# Patient Record
Sex: FEMALE | Race: WHITE | URBAN - METROPOLITAN AREA
[De-identification: names, ages, dates, MRNs, and addresses within clinical notes are randomized per-mention and may not be internally consistent; named-entity substitution may affect disease eponyms.]

---

## 2017-01-31 ENCOUNTER — TRANSFERRED RECORDS (OUTPATIENT)
Dept: HEALTH INFORMATION MANAGEMENT | Facility: CLINIC | Age: 47
End: 2017-01-31

## 2017-03-10 ENCOUNTER — TRANSFERRED RECORDS (OUTPATIENT)
Dept: HEALTH INFORMATION MANAGEMENT | Facility: CLINIC | Age: 47
End: 2017-03-10

## 2017-03-28 ENCOUNTER — TRANSFERRED RECORDS (OUTPATIENT)
Dept: HEALTH INFORMATION MANAGEMENT | Facility: CLINIC | Age: 47
End: 2017-03-28

## 2017-06-15 ENCOUNTER — TRANSFERRED RECORDS (OUTPATIENT)
Dept: HEALTH INFORMATION MANAGEMENT | Facility: CLINIC | Age: 47
End: 2017-06-15

## 2017-07-02 ENCOUNTER — TRANSFERRED RECORDS (OUTPATIENT)
Dept: HEALTH INFORMATION MANAGEMENT | Facility: CLINIC | Age: 47
End: 2017-07-02

## 2017-07-11 ENCOUNTER — HOSPITAL ENCOUNTER (OUTPATIENT)
Facility: CLINIC | Age: 47
Setting detail: OBSERVATION
Discharge: HOME | End: 2017-07-12
Attending: EMERGENCY MEDICINE | Admitting: HOSPITALIST

## 2017-07-11 DIAGNOSIS — R10.9 CHRONIC ABDOMINAL PAIN: ICD-10-CM

## 2017-07-11 DIAGNOSIS — G89.29 CHRONIC ABDOMINAL PAIN: ICD-10-CM

## 2017-07-11 DIAGNOSIS — R11.2 NAUSEA AND VOMITING, INTRACTABILITY OF VOMITING NOT SPECIFIED, UNSPECIFIED VOMITING TYPE: ICD-10-CM

## 2017-07-11 LAB
ALBUMIN SERPL-MCNC: 4.2 G/DL (ref 3.4–5)
ALBUMIN UR-MCNC: NEGATIVE MG/DL
ALP SERPL-CCNC: 86 U/L (ref 40–150)
ALT SERPL W P-5'-P-CCNC: 45 U/L (ref 0–50)
AMPHETAMINES UR QL SCN: ABNORMAL
ANION GAP SERPL CALCULATED.3IONS-SCNC: 8 MMOL/L (ref 3–14)
APPEARANCE UR: CLEAR
AST SERPL W P-5'-P-CCNC: 21 U/L (ref 0–45)
BARBITURATES UR QL: ABNORMAL
BASOPHILS # BLD AUTO: 0.1 10E9/L (ref 0–0.2)
BASOPHILS NFR BLD AUTO: 0.4 %
BENZODIAZ UR QL: ABNORMAL
BILIRUB SERPL-MCNC: 1.8 MG/DL (ref 0.2–1.3)
BILIRUB UR QL STRIP: NEGATIVE
BUN SERPL-MCNC: 12 MG/DL (ref 7–30)
CALCIUM SERPL-MCNC: 8.9 MG/DL (ref 8.5–10.1)
CANNABINOIDS UR QL SCN: ABNORMAL
CHLORIDE SERPL-SCNC: 106 MMOL/L (ref 94–109)
CO2 SERPL-SCNC: 27 MMOL/L (ref 20–32)
COCAINE UR QL: ABNORMAL
COLOR UR AUTO: YELLOW
CREAT SERPL-MCNC: 0.48 MG/DL (ref 0.52–1.04)
DIFFERENTIAL METHOD BLD: ABNORMAL
EOSINOPHIL # BLD AUTO: 0 10E9/L (ref 0–0.7)
EOSINOPHIL NFR BLD AUTO: 0.1 %
ERYTHROCYTE [DISTWIDTH] IN BLOOD BY AUTOMATED COUNT: 18.4 % (ref 10–15)
GFR SERPL CREATININE-BSD FRML MDRD: ABNORMAL ML/MIN/1.7M2
GLUCOSE BLDC GLUCOMTR-MCNC: 223 MG/DL (ref 70–99)
GLUCOSE BLDC GLUCOMTR-MCNC: 244 MG/DL (ref 70–99)
GLUCOSE BLDC GLUCOMTR-MCNC: 286 MG/DL (ref 70–99)
GLUCOSE SERPL-MCNC: 222 MG/DL (ref 70–99)
GLUCOSE UR STRIP-MCNC: 150 MG/DL
HCT VFR BLD AUTO: 35.6 % (ref 35–47)
HGB BLD-MCNC: 11 G/DL (ref 11.7–15.7)
HGB UR QL STRIP: NEGATIVE
IMM GRANULOCYTES # BLD: 0.1 10E9/L (ref 0–0.4)
IMM GRANULOCYTES NFR BLD: 0.5 %
KETONES UR STRIP-MCNC: 80 MG/DL
LACTATE BLD-SCNC: 1 MMOL/L (ref 0.7–2.1)
LACTATE SERPL-SCNC: 2.6 MMOL/L (ref 0.4–2)
LEUKOCYTE ESTERASE UR QL STRIP: NEGATIVE
LIPASE SERPL-CCNC: 113 U/L (ref 73–393)
LYMPHOCYTES # BLD AUTO: 2.5 10E9/L (ref 0.8–5.3)
LYMPHOCYTES NFR BLD AUTO: 18.3 %
MCH RBC QN AUTO: 21.2 PG (ref 26.5–33)
MCHC RBC AUTO-ENTMCNC: 30.9 G/DL (ref 31.5–36.5)
MCV RBC AUTO: 69 FL (ref 78–100)
MONOCYTES # BLD AUTO: 0.6 10E9/L (ref 0–1.3)
MONOCYTES NFR BLD AUTO: 4.6 %
MUCOUS THREADS #/AREA URNS LPF: PRESENT /LPF
NEUTROPHILS # BLD AUTO: 10.4 10E9/L (ref 1.6–8.3)
NEUTROPHILS NFR BLD AUTO: 76.1 %
NITRATE UR QL: NEGATIVE
NRBC # BLD AUTO: 0 10*3/UL
NRBC BLD AUTO-RTO: 0 /100
OPIATES UR QL SCN: ABNORMAL
PCP UR QL SCN: ABNORMAL
PH UR STRIP: 6 PH (ref 5–7)
PLATELET # BLD AUTO: 360 10E9/L (ref 150–450)
POTASSIUM SERPL-SCNC: 3.4 MMOL/L (ref 3.4–5.3)
PROT SERPL-MCNC: 7.7 G/DL (ref 6.8–8.8)
RBC # BLD AUTO: 5.2 10E12/L (ref 3.8–5.2)
RBC #/AREA URNS AUTO: 1 /HPF (ref 0–2)
SODIUM SERPL-SCNC: 141 MMOL/L (ref 133–144)
SP GR UR STRIP: 1.02 (ref 1–1.03)
URN SPEC COLLECT METH UR: ABNORMAL
UROBILINOGEN UR STRIP-MCNC: 2 MG/DL (ref 0–2)
WBC # BLD AUTO: 13.7 10E9/L (ref 4–11)
WBC #/AREA URNS AUTO: 1 /HPF (ref 0–2)

## 2017-07-11 PROCEDURE — 25000128 H RX IP 250 OP 636: Performed by: PHYSICIAN ASSISTANT

## 2017-07-11 PROCEDURE — 96376 TX/PRO/DX INJ SAME DRUG ADON: CPT

## 2017-07-11 PROCEDURE — 25000125 ZZHC RX 250: Performed by: PHYSICIAN ASSISTANT

## 2017-07-11 PROCEDURE — 83690 ASSAY OF LIPASE: CPT | Performed by: EMERGENCY MEDICINE

## 2017-07-11 PROCEDURE — 83605 ASSAY OF LACTIC ACID: CPT | Performed by: PHYSICIAN ASSISTANT

## 2017-07-11 PROCEDURE — 96375 TX/PRO/DX INJ NEW DRUG ADDON: CPT

## 2017-07-11 PROCEDURE — 25000125 ZZHC RX 250: Performed by: EMERGENCY MEDICINE

## 2017-07-11 PROCEDURE — 25000132 ZZH RX MED GY IP 250 OP 250 PS 637: Performed by: PHYSICIAN ASSISTANT

## 2017-07-11 PROCEDURE — 80307 DRUG TEST PRSMV CHEM ANLYZR: CPT | Performed by: EMERGENCY MEDICINE

## 2017-07-11 PROCEDURE — 96372 THER/PROPH/DIAG INJ SC/IM: CPT

## 2017-07-11 PROCEDURE — 96361 HYDRATE IV INFUSION ADD-ON: CPT

## 2017-07-11 PROCEDURE — 25000128 H RX IP 250 OP 636: Performed by: EMERGENCY MEDICINE

## 2017-07-11 PROCEDURE — 83605 ASSAY OF LACTIC ACID: CPT | Performed by: EMERGENCY MEDICINE

## 2017-07-11 PROCEDURE — 00000146 ZZHCL STATISTIC GLUCOSE BY METER IP

## 2017-07-11 PROCEDURE — 81001 URINALYSIS AUTO W/SCOPE: CPT | Performed by: EMERGENCY MEDICINE

## 2017-07-11 PROCEDURE — 36415 COLL VENOUS BLD VENIPUNCTURE: CPT | Performed by: PHYSICIAN ASSISTANT

## 2017-07-11 PROCEDURE — 99285 EMERGENCY DEPT VISIT HI MDM: CPT | Mod: 25

## 2017-07-11 PROCEDURE — 99220 ZZC INITIAL OBSERVATION CARE,LEVL III: CPT | Performed by: PHYSICIAN ASSISTANT

## 2017-07-11 PROCEDURE — 25000131 ZZH RX MED GY IP 250 OP 636 PS 637: Performed by: PHYSICIAN ASSISTANT

## 2017-07-11 PROCEDURE — 96374 THER/PROPH/DIAG INJ IV PUSH: CPT

## 2017-07-11 PROCEDURE — 85025 COMPLETE CBC W/AUTO DIFF WBC: CPT | Performed by: EMERGENCY MEDICINE

## 2017-07-11 PROCEDURE — 80053 COMPREHEN METABOLIC PANEL: CPT | Performed by: EMERGENCY MEDICINE

## 2017-07-11 PROCEDURE — G0378 HOSPITAL OBSERVATION PER HR: HCPCS

## 2017-07-11 RX ORDER — PROCHLORPERAZINE 25 MG
25 SUPPOSITORY, RECTAL RECTAL EVERY 12 HOURS PRN
Status: DISCONTINUED | OUTPATIENT
Start: 2017-07-11 | End: 2017-07-12 | Stop reason: HOSPADM

## 2017-07-11 RX ORDER — ONDANSETRON 2 MG/ML
4 INJECTION INTRAMUSCULAR; INTRAVENOUS EVERY 6 HOURS PRN
Status: DISCONTINUED | OUTPATIENT
Start: 2017-07-11 | End: 2017-07-12 | Stop reason: HOSPADM

## 2017-07-11 RX ORDER — IBUPROFEN 600 MG/1
600 TABLET, FILM COATED ORAL EVERY 6 HOURS PRN
Status: DISCONTINUED | OUTPATIENT
Start: 2017-07-11 | End: 2017-07-12 | Stop reason: HOSPADM

## 2017-07-11 RX ORDER — DIPHENHYDRAMINE HYDROCHLORIDE 50 MG/ML
25 INJECTION INTRAMUSCULAR; INTRAVENOUS ONCE
Status: COMPLETED | OUTPATIENT
Start: 2017-07-11 | End: 2017-07-11

## 2017-07-11 RX ORDER — ONDANSETRON 4 MG/1
4 TABLET, ORALLY DISINTEGRATING ORAL EVERY 6 HOURS PRN
Status: DISCONTINUED | OUTPATIENT
Start: 2017-07-11 | End: 2017-07-12 | Stop reason: HOSPADM

## 2017-07-11 RX ORDER — ONDANSETRON 4 MG/1
4 TABLET, ORALLY DISINTEGRATING ORAL EVERY 8 HOURS PRN
Qty: 5 TABLET | Refills: 0 | Status: SHIPPED | OUTPATIENT
Start: 2017-07-11 | End: 2017-07-12

## 2017-07-11 RX ORDER — DULOXETIN HYDROCHLORIDE 30 MG/1
60 CAPSULE, DELAYED RELEASE ORAL EVERY EVENING
Status: DISCONTINUED | OUTPATIENT
Start: 2017-07-11 | End: 2017-07-12 | Stop reason: HOSPADM

## 2017-07-11 RX ORDER — METOCLOPRAMIDE HYDROCHLORIDE 5 MG/ML
10 INJECTION INTRAMUSCULAR; INTRAVENOUS ONCE
Status: COMPLETED | OUTPATIENT
Start: 2017-07-11 | End: 2017-07-11

## 2017-07-11 RX ORDER — POLYETHYLENE GLYCOL 3350 17 G/17G
17 POWDER, FOR SOLUTION ORAL DAILY PRN
Status: DISCONTINUED | OUTPATIENT
Start: 2017-07-11 | End: 2017-07-12 | Stop reason: HOSPADM

## 2017-07-11 RX ORDER — DEXTROSE MONOHYDRATE 25 G/50ML
25-50 INJECTION, SOLUTION INTRAVENOUS
Status: DISCONTINUED | OUTPATIENT
Start: 2017-07-11 | End: 2017-07-12 | Stop reason: HOSPADM

## 2017-07-11 RX ORDER — AMOXICILLIN 250 MG
1-2 CAPSULE ORAL 2 TIMES DAILY PRN
Status: DISCONTINUED | OUTPATIENT
Start: 2017-07-11 | End: 2017-07-12 | Stop reason: HOSPADM

## 2017-07-11 RX ORDER — PROMETHAZINE HYDROCHLORIDE 25 MG/ML
25 INJECTION, SOLUTION INTRAMUSCULAR; INTRAVENOUS ONCE
Status: COMPLETED | OUTPATIENT
Start: 2017-07-11 | End: 2017-07-11

## 2017-07-11 RX ORDER — MORPHINE SULFATE 4 MG/ML
4 INJECTION, SOLUTION INTRAMUSCULAR; INTRAVENOUS ONCE
Status: DISCONTINUED | OUTPATIENT
Start: 2017-07-11 | End: 2017-07-11

## 2017-07-11 RX ORDER — METOCLOPRAMIDE HYDROCHLORIDE 5 MG/ML
10 INJECTION INTRAMUSCULAR; INTRAVENOUS EVERY 6 HOURS PRN
Status: DISCONTINUED | OUTPATIENT
Start: 2017-07-11 | End: 2017-07-12 | Stop reason: HOSPADM

## 2017-07-11 RX ORDER — MORPHINE SULFATE 2 MG/ML
2 INJECTION, SOLUTION INTRAMUSCULAR; INTRAVENOUS ONCE
Status: COMPLETED | OUTPATIENT
Start: 2017-07-11 | End: 2017-07-11

## 2017-07-11 RX ORDER — KETOROLAC TROMETHAMINE 30 MG/ML
30 INJECTION, SOLUTION INTRAMUSCULAR; INTRAVENOUS EVERY 6 HOURS PRN
Status: DISCONTINUED | OUTPATIENT
Start: 2017-07-11 | End: 2017-07-12 | Stop reason: HOSPADM

## 2017-07-11 RX ORDER — LORAZEPAM 2 MG/ML
.5-1 INJECTION INTRAMUSCULAR EVERY 4 HOURS PRN
Status: DISCONTINUED | OUTPATIENT
Start: 2017-07-11 | End: 2017-07-12 | Stop reason: HOSPADM

## 2017-07-11 RX ORDER — ACETAMINOPHEN 325 MG/1
650 TABLET ORAL EVERY 4 HOURS PRN
Status: DISCONTINUED | OUTPATIENT
Start: 2017-07-11 | End: 2017-07-12 | Stop reason: HOSPADM

## 2017-07-11 RX ORDER — OXYCODONE AND ACETAMINOPHEN 5; 325 MG/1; MG/1
TABLET ORAL EVERY 4 HOURS PRN
COMMUNITY

## 2017-07-11 RX ORDER — METOCLOPRAMIDE HYDROCHLORIDE 5 MG/ML
5 INJECTION INTRAMUSCULAR; INTRAVENOUS ONCE
Status: COMPLETED | OUTPATIENT
Start: 2017-07-11 | End: 2017-07-11

## 2017-07-11 RX ORDER — METOCLOPRAMIDE 10 MG/1
10 TABLET ORAL EVERY 6 HOURS PRN
Status: DISCONTINUED | OUTPATIENT
Start: 2017-07-11 | End: 2017-07-12 | Stop reason: HOSPADM

## 2017-07-11 RX ORDER — MORPHINE SULFATE 4 MG/ML
4 INJECTION, SOLUTION INTRAMUSCULAR; INTRAVENOUS ONCE
Status: COMPLETED | OUTPATIENT
Start: 2017-07-11 | End: 2017-07-11

## 2017-07-11 RX ORDER — NICOTINE POLACRILEX 4 MG
15-30 LOZENGE BUCCAL
Status: DISCONTINUED | OUTPATIENT
Start: 2017-07-11 | End: 2017-07-12 | Stop reason: HOSPADM

## 2017-07-11 RX ORDER — ONDANSETRON 2 MG/ML
4 INJECTION INTRAMUSCULAR; INTRAVENOUS ONCE
Status: COMPLETED | OUTPATIENT
Start: 2017-07-11 | End: 2017-07-11

## 2017-07-11 RX ORDER — PANTOPRAZOLE SODIUM 40 MG/1
40 TABLET, DELAYED RELEASE ORAL
Status: DISCONTINUED | OUTPATIENT
Start: 2017-07-11 | End: 2017-07-12 | Stop reason: HOSPADM

## 2017-07-11 RX ORDER — NALOXONE HYDROCHLORIDE 0.4 MG/ML
.1-.4 INJECTION, SOLUTION INTRAMUSCULAR; INTRAVENOUS; SUBCUTANEOUS
Status: DISCONTINUED | OUTPATIENT
Start: 2017-07-11 | End: 2017-07-12 | Stop reason: HOSPADM

## 2017-07-11 RX ORDER — PROCHLORPERAZINE MALEATE 5 MG
5-10 TABLET ORAL EVERY 6 HOURS PRN
Status: DISCONTINUED | OUTPATIENT
Start: 2017-07-11 | End: 2017-07-12 | Stop reason: HOSPADM

## 2017-07-11 RX ORDER — SODIUM CHLORIDE 9 MG/ML
INJECTION, SOLUTION INTRAVENOUS CONTINUOUS
Status: ACTIVE | OUTPATIENT
Start: 2017-07-11 | End: 2017-07-12

## 2017-07-11 RX ORDER — ONDANSETRON 4 MG/1
4 TABLET, ORALLY DISINTEGRATING ORAL ONCE
Status: COMPLETED | OUTPATIENT
Start: 2017-07-11 | End: 2017-07-11

## 2017-07-11 RX ORDER — PANTOPRAZOLE SODIUM 40 MG/1
40 TABLET, DELAYED RELEASE ORAL
Status: DISCONTINUED | OUTPATIENT
Start: 2017-07-11 | End: 2017-07-11

## 2017-07-11 RX ADMIN — SODIUM CHLORIDE: 9 INJECTION, SOLUTION INTRAVENOUS at 10:17

## 2017-07-11 RX ADMIN — ONDANSETRON 4 MG: 4 TABLET, ORALLY DISINTEGRATING ORAL at 07:27

## 2017-07-11 RX ADMIN — MORPHINE SULFATE 2 MG: 2 INJECTION, SOLUTION INTRAMUSCULAR; INTRAVENOUS at 08:39

## 2017-07-11 RX ADMIN — INSULIN DETEMIR 25 UNITS: 100 INJECTION, SOLUTION SUBCUTANEOUS at 21:49

## 2017-07-11 RX ADMIN — ONDANSETRON 4 MG: 2 INJECTION INTRAMUSCULAR; INTRAVENOUS at 11:26

## 2017-07-11 RX ADMIN — SODIUM CHLORIDE: 9 INJECTION, SOLUTION INTRAVENOUS at 18:18

## 2017-07-11 RX ADMIN — LORAZEPAM 1 MG: 2 INJECTION INTRAMUSCULAR; INTRAVENOUS at 16:44

## 2017-07-11 RX ADMIN — DULOXETINE HYDROCHLORIDE 60 MG: 30 CAPSULE, DELAYED RELEASE ORAL at 21:48

## 2017-07-11 RX ADMIN — PROMETHAZINE HYDROCHLORIDE 25 MG: 25 INJECTION INTRAMUSCULAR; INTRAVENOUS at 05:57

## 2017-07-11 RX ADMIN — PANTOPRAZOLE SODIUM 40 MG: 40 INJECTION, POWDER, FOR SOLUTION INTRAVENOUS at 11:26

## 2017-07-11 RX ADMIN — DIPHENHYDRAMINE HYDROCHLORIDE 25 MG: 50 INJECTION, SOLUTION INTRAMUSCULAR; INTRAVENOUS at 08:38

## 2017-07-11 RX ADMIN — KETOROLAC TROMETHAMINE 30 MG: 30 INJECTION, SOLUTION INTRAMUSCULAR; INTRAVENOUS at 10:18

## 2017-07-11 RX ADMIN — SODIUM CHLORIDE 1000 ML: 9 INJECTION, SOLUTION INTRAVENOUS at 08:55

## 2017-07-11 RX ADMIN — PROCHLORPERAZINE EDISYLATE 10 MG: 5 INJECTION INTRAMUSCULAR; INTRAVENOUS at 10:17

## 2017-07-11 RX ADMIN — METOCLOPRAMIDE 10 MG: 5 INJECTION, SOLUTION INTRAMUSCULAR; INTRAVENOUS at 04:37

## 2017-07-11 RX ADMIN — MORPHINE SULFATE 4 MG: 4 INJECTION, SOLUTION INTRAMUSCULAR; INTRAVENOUS at 04:58

## 2017-07-11 RX ADMIN — SODIUM CHLORIDE 1000 ML: 9 INJECTION, SOLUTION INTRAVENOUS at 04:36

## 2017-07-11 RX ADMIN — METOCLOPRAMIDE 5 MG: 5 INJECTION, SOLUTION INTRAMUSCULAR; INTRAVENOUS at 08:38

## 2017-07-11 RX ADMIN — METOCLOPRAMIDE 10 MG: 5 INJECTION, SOLUTION INTRAMUSCULAR; INTRAVENOUS at 15:01

## 2017-07-11 RX ADMIN — ONDANSETRON 4 MG: 2 INJECTION INTRAMUSCULAR; INTRAVENOUS at 04:36

## 2017-07-11 ASSESSMENT — ENCOUNTER SYMPTOMS
BLOOD IN STOOL: 0
APPETITE CHANGE: 0
NAUSEA: 1
ABDOMINAL PAIN: 1
VOMITING: 1
CHILLS: 0
DIARRHEA: 0
DYSURIA: 0
FEVER: 0

## 2017-07-11 NOTE — ED NOTES
Patient is hear visiting from [place]. States she has been dealing with N/V and abdominal pain for the last 2 years and had multiple workups with no definitive cause for symptoms. Pt reports burning pain in her abdomen.

## 2017-07-11 NOTE — ED PROVIDER NOTES
At time of patient started having episodes of nausea and vomiting again. She also reports upper abdominal pain. The plan initially per Dr. Garcia was to have patient discharged home with medication for nausea. However based on her re-recurrent symptoms and difficulty controlling recurrent vomiting despite multiple IV medications, I felt patient would require hospitalization for further treatment. On her repeat examination she had a soft abdomen, hypoactive bowel sounds, but no evidence of obstructive findings or other areas concerning pain specifically in the lower abdomen. Therefore I did not feel CT scan of the abdomen was warranted at this time. I discussed case Hospitalist who agreed to admit the patient.     Wang Paul MD  07/11/17 0810

## 2017-07-11 NOTE — IP AVS SNAPSHOT
MRN:[id number]                      After Visit Summary   7/11/2017    Elizabet Hooker    MRN: [id number]           Thank you!     Thank you for choosing [facility] for your care. Our goal is always to provide you with excellent care. Hearing back from our patients is one way we can continue to improve our services. Please take a few minutes to complete the written survey that you may receive in the mail after you visit. If you would like to speak to someone directly about your visit please contact Patient Relations at [phone]. Thank you!          Patient Information     Date Of Birth          1970        About your hospital stay     You were admitted on:  July 11, 2017 You last received care in the:  [facility] Observation Department    You were discharged on:  July 12, 2017        Reason for your hospital stay       Nausea, vomiting, abdominal pain. Likely cannabinoid hyperemesis syndrome                  Who to Call     For medical emergencies, please call 911.  For non-urgent questions about your medical care, please call your primary care provider or clinic, None          Attending Provider     Provider Specialty    Wang Mg MD Emergency Medicine    Wang Paul MD Emergency Medicine    Ant Hoffman MD Internal Medicine       Primary Care Provider    [facility]      After Care Instructions     Activity       Your activity upon discharge: activity as tolerated            Diet       Follow this diet upon discharge: Orders Placed This Encounter      Advance Diet as Tolerated: Regular Diet Adult                  Follow-up Appointments     Follow-up and recommended labs and tests        Follow up with primary care provider, [facility] Other, within 7 days for hospital follow- up.  No follow up labs or test are needed.                   Follow-up Information     Follow up with primary care physician in [facility]. Schedule an  "appointment as soon as possible for a visit in 2 days.        Follow up with [facility] Emergency Department.    Specialty:  EMERGENCY MEDICINE    Why:  As needed, If symptoms worsen    Contact information:    [address]  [facility] [address] [phone]                Pending Results     No orders found for last 3 day(s).            Statement of Approval     Ordered          17 0837  I have reviewed and agree with all the recommendations and orders detailed in this document.  EFFECTIVE NOW     Approved and electronically signed by:  Ant Hoffman MD             Admission Information     Date & Time Provider Department Dept. Phone    2017 Ant Hoffman MD [facility] Observation Department [phone]      Your Vitals Were     Blood Pressure Pulse Temperature Respirations Height Weight    126/69 (BP Location: Right arm) 96 99.1  F (37.3  C) (Oral) 16 1.6 m (5' 3\") 82.6 kg (182 lb)    Pulse Oximetry BMI (Body Mass Index)                94% 32.24 kg/m2          [employer] Information     [facility] lets you send messages to your doctor, view your test results, renew your prescriptions, schedule appointments and more. To sign up, go to [email or web address] . Click on \"Log in\" on the left side of the screen, which will take you to the Welcome page. Then click on \"Sign up Now\" on the right side of the page.     You will be asked to enter the access code listed below, as well as some personal information. Please follow the directions to create your username and password.     Your access code is: [email or web address]  Expires: 10/9/2017  6:48 AM     Your access code will  in 90 days. If you need help or a new code, please call your [place] clinic or [phone].        Care EveryWhere ID     This is your Care EveryWhere ID. This could be used by other organizations to access your [facility] medical records  [id number]        Equal Access to Services     " MARIAN MCMANUS : Hadii aad ku la nena Soobduliaali, waaxda luqadaha, qaybta kaalmada adeeganette, waxkwasi enriqueta haygautam carrascojoecassie alcala . So [facility] [phone].    ATENCIÓN: Si habla español, tiene a brunson disposición servicios gratuitos de asistencia lingüística. Llame al [phone].    We comply with applicable federal civil rights laws and [place] laws. We do not discriminate on the basis of race, color, national origin, age, disability sex, sexual orientation or gender identity.               Review of your medicines      CONTINUE these medicines which have NOT CHANGED        Dose / Directions    CYMBALTA PO        Dose:  60 mg   Take 60 mg by mouth every evening   Refills:  0       HumaLOG KWIKpen 100 UNIT/ML injection   Generic drug:  insulin lispro        Dose:  10-15 Units   Inject 10-15 Units Subcutaneous 3 times daily (before meals)   Refills:  0       LEVEMIR FLEXPEN/FLEXTOUCH 100 UNIT/ML injection   Generic drug:  insulin detemir        Dose:  50 Units   Inject 50 Units Subcutaneous At Bedtime   Refills:  0       oxyCODONE-acetaminophen 5-325 MG per tablet   Commonly known as:  PERCOCET        Take by mouth every 4 hours as needed for moderate to severe pain   Refills:  0       PANTOPRAZOLE SODIUM PO        Dose:  40 mg   Take 40 mg by mouth 2 times daily (before meals)   Refills:  0                Protect others around you: Learn how to safely use, store and throw away your medicines at www.disposemymeds.org.             Medication List: This is a list of all your medications and when to take them. Check marks below indicate your daily home schedule. Keep this list as a reference.      Medications           Morning Afternoon Evening Bedtime As Needed    CYMBALTA PO   Take 60 mg by mouth every evening   Last time this was given:  60 mg on 7/11/2017  9:48 PM   Next Dose Due:  Take next dose this evening                                   HumaLOG KWIKpen 100 UNIT/ML injection   Inject 10-15 Units Subcutaneous 3  times daily (before meals)   Generic drug:  insulin lispro   Next Dose Due:  Resume home regimen                                LEVEMIR FLEXPEN/FLEXTOUCH 100 UNIT/ML injection   Inject 50 Units Subcutaneous At Bedtime   Last time this was given:  25 Units on 7/11/2017  9:49 PM   Generic drug:  insulin detemir   Next Dose Due:  Take next dose tonight before bed                                   oxyCODONE-acetaminophen 5-325 MG per tablet   Commonly known as:  PERCOCET   Take by mouth every 4 hours as needed for moderate to severe pain                                   PANTOPRAZOLE SODIUM PO   Take 40 mg by mouth 2 times daily (before meals)   Next Dose Due:  Take next dose before dinner                                             More Information        *Abdominal Pain, Unknown Cause (Female)    The exact cause of your abdominal (stomach) pain is not certain. This does not mean that this is something to worry about, or the right tests were not done. Everyone likes to know the exact cause of the problem, but sometimes with abdominal pain, there is no clear-cut cause, and this could be a good thing. The good news is that your symptoms can be treated, and you will feel better.   Your condition does not seem serious now; however, sometimes the signs of a serious problem may take more time to appear. For this reason, it is important for you to watch for any new symptoms, problems, or worsening of your condition.  Over the next few days, the abdominal pain may come and go, or be continuous. Other common symptoms can include nausea and vomiting. Sometimes it can be difficult to tell if you feel nauseous, you may just feel bad and not associate that feeling with nausea. Constipation, diarrhea, and a fever may go along with the pain.  The pain may continue even if treated correctly over the following days. Depending on how things go, sometimes the cause can become clear and may require further or different treatment.  Additional evaluations, medications, or tests may be needed.  Home care  Your health care provider may prescribe medications for pain, symptoms, or an infection.  Follow the health care provider's instructions for taking these medications.  General care    Rest until your next exam. No strenuous activities.    Try to find positions that ease discomfort. A small pillow placed on the abdomen may help relieve pain.    Something warm on your abdomen (such as a heating pad) may help, but be careful not to burn yourself.  Diet    Do not force yourself to eat, especially if having cramps, vomiting, or diarrhea.    Water is important so you do not get dehydrated. Soup may also be good. Sports drinks may also help, especially if they are not too acidic. Make sure you don't drink sugary drinks as this can make things worse. Take liquids in small amounts. Do not guzzle them.    Caffeine sometimes makes the pain and cramping worse.    Avoid dairy products if you have vomiting or diarrhea.    Don't eat large amounts at a time. Wait a few minutes between bites.    Eat a diet low in fiber (called a low-residue diet). Foods allowed include refined breads, white rice, fruit and vegetable juices without pulp, tender meats. These foods will pass more easily through the intestine.    Avoid fried or fatty foods, dairy, alcohol and spicy foods until your symptoms go away.  Follow-up care  Follow up with your health care provider as instructed, or if your pain does not begin to improve in the next 24 hours.  When to seek medical care  Seek prompt medical care if any of the following occur:    Pain gets worse or moves to the right lower abdomen    New or worsening vomiting or diarrhea    Swelling of the abdomen    Unable to pass stool for more than three days    New fever over 101  F (38.3 C), or rising fever    Blood in vomit or bowel movements (dark red or black color)    Jaundice (yellow color of eyes and skin)    Weakness,  "dizziness    Chest, arm, back, neck or jaw pain    Unexpected vaginal bleeding or missed period  Call 911  Call emergency services if any of the following occur:    Trouble breathing    Confusion    Fainting or loss of consciousness    Rapid heart rate    Seizure    [id number] [employer], [address] [place] [address], [place] [address]. All rights reserved. This information is not intended as a substitute for professional medical care. Always follow your healthcare professional's instructions.                 * VOMITING [6yr-Adult]  Vomiting is a common symptom that may be due to different causes. These include gastroenteritis (\"stomach-flu\"), food poisoning and gastritis. There are other more serious causes of vomiting which may be hard to diagnose early in the illness. Therefore, it is important to watch for the warning signs listed below.  The main danger from repeated vomiting is \"dehydration\". This is due to excess loss of water and minerals from the body. When this occurs, body fluids must be replaced.`  HOME CARE:    If symptoms are severe, rest at home for the next 24 hours.    You may use acetaminophen (Tylenol) 650-1000 mg every 6 hours to control fever, unless another medicine was prescribed. [ NOTE : If you have chronic liver disease, talk with your doctor before using acetaminophen.] (Aspirin should never be used in anyone under 18 years of age who is ill with a fever. It may cause severe liver damage.)    Avoid tobacco and alcohol use, which may worsen your symptoms.    If medicines for vomiting were prescribed, take as directed.  DURING THE FIRST 12-24 HOURS follow the diet below. Try to take frequent small sips even if you vomit occasionally:    FRUIT JUICES: Apple, grape juice, clear fruit drinks, electrolyte replacement and sports drinks.    BEVERAGES: Sport drinks such as Gatorade, soft drinks without caffeine; mineral water (plain or flavored), decaffeinated tea and coffee.    SOUPS: Clear broth, " consommé and bouillon    DESSERTS: Plain gelatin (Jell-O), popsicles and fruit juice bars.  DURING THE NEXT 24 HOURS you may add the following to the above:    Hot cereal, plain toast, bread, rolls, crackers    Plain noodles, rice, mashed potatoes, chicken noodle or rice soup    Unsweetened canned fruit (avoid pineapple), bananas    Avoid dairy products    Limit caffeine and chocolate. No spices or seasonings except salt.  DURING THE NEXT 24 HOURS  Slowly go back to a normal diet, as you feel better and your symptoms lessen.  FOLLOW UP with your doctor as advised if you are not improving over the next 2-3 days.  GET PROMPT MEDICAL ATTENTION if any of the following occur:    Constant abdominal pain that stays in the same spot or gets worse    Continued vomiting (unable to keep liquids down) for 24 hours    Frequent diarrhea (more than 5 times a day); blood (red or black color) in diarrhea    No urine output for 12 hours or extreme thirst    Weakness, dizziness or fainting    Unusually drowsy or confused    Fever over 101.0  F (38.3  C) for more than 3 days    Yellow color of the eyes or skin    [phone] [facility], [address] [place] [address], [place] [address]. All rights reserved. This information is not intended as a substitute for professional medical care. Always follow your healthcare professional's instructions.

## 2017-07-11 NOTE — ED NOTES
"Patient with continuous nausea and dry heaving since I arrived at 0710 and took over patients care. I was in to patients room at 0715 to discharge her to home when she both complained of nausea and complained about her IV being \"leaking\". IV found to be pulled out and lying by patient. Site with minimal bleeding, dressing applied. Dr Paul notified of events and Zofran ODT was given.    At 0800 patient was reassessed, Dr Paul had been in to see her. IV re-inserted at 0805. Patient to be admitted to OBS. I entered the room to give additional nausea medications at 0815 and her IV was out again, lying on bed with blood on gown, bed and arms.  Patient instructed to be careful not to pull out her IV. Complains of nausea and abdominal pain and requesting pain meds.    At 0830 2 additional attempts were made to re-insert IV by another nurse without success.  At 0835 IV placed in left wrist and secured by writer. Velcro arm board applied to protect IV. meds given shortly after for pain and nausea, including benadryl for restlessness s/p reglan and phenergan given earlier.         "

## 2017-07-11 NOTE — ED NOTES
Bed: ED10  Expected date: 7/11/17  Expected time: 4:09 AM  Means of arrival:   Comments:  COLEEN 56F

## 2017-07-11 NOTE — PLAN OF CARE
Problem: Discharge Planning  Goal: Discharge Planning (Adult, OB, Behavioral, Peds)  Outcome: No Change  PRIMARY DIAGNOSIS: GASTROENTERITIS     OUTPATIENT/OBSERVATION GOALS TO BE MET BEFORE DISCHARGE  1. Orthostatic performed: N/A     2. Tolerating PO fluid and/or antibiotics (if applicable): No - constant nausea, unable to eat     3. Nausea/Vomiting/Diarrhea symptoms improved: No,  constant nausea, no emesis, abdominal pain improved     4. Pain status: Pain free.     5. Return to near baseline physical activity: No - pt reports feeling weak     Discharge Planner Nurse   Safe discharge environment identified: Yes, once medically cleared.  Barriers to discharge: Not once medically cleared       Entered by: Nini Malone 07/11/2017 5:48 PM     Please review provider order for any additional goals.   Nurse to notify provider when observation goals have been met and patient is ready for discharge.

## 2017-07-11 NOTE — IP AVS SNAPSHOT
[facility] Observation Department    [address]    [facility] [address]    Phone:  [phone]                                       After Visit Summary   7/11/2017    Elizabet Hooker    MRN: [id number]           After Visit Summary Signature Page     I have received my discharge instructions, and my questions have been answered. I have discussed any challenges I see with this plan with the nurse or doctor.    ..........................................................................................................................................  Patient/Patient Representative Signature      ..........................................................................................................................................  Patient Representative Print Name and Relationship to Patient    ..................................................               ................................................  Date                                            Time    ..........................................................................................................................................  Reviewed by Signature/Title    ...................................................              ..............................................  Date                                                            Time

## 2017-07-11 NOTE — ED NOTES
Sepsis protocol popped up an hour after patient's vital signs were entered into EPIC.  Will order lactic and recheck vitals in 30 minutes.  MD notified.

## 2017-07-11 NOTE — ED AVS SNAPSHOT
[facility] Emergency Department    [address]    [facility] [address]    Phone:  [phone]    Fax:  [phone]                                       Elizabet Hooker   MRN: [id number]    Department:  [facility] Emergency Department   Date of Visit:  7/11/2017           Patient Information     Date Of Birth          1970        Your diagnoses for this visit were:     Nausea and vomiting, intractability of vomiting not specified, unspecified vomiting type     Chronic abdominal pain        You were seen by Wang Mg MD.      Follow-up Information     Follow up with primary care physician in [facility]. Schedule an appointment as soon as possible for a visit in 2 days.        Follow up with [facility] Emergency Department.    Specialty:  EMERGENCY MEDICINE    Why:  As needed, If symptoms worsen    Contact information:    [address]  [facility] [id number] [phone]      Discharge References/Attachments     ABDOMINAL PAIN, UNKNOWN CAUSE, (FEMALE) (ENGLISH)    VOMITING (6Y-ADULT) (ENGLISH)      24 Hour Appointment Hotline       To make an appointment at any [place] clinic, call [phone] ([phone]). If you don't have a family doctor or clinic, we will help you find one. [place] clinics are conveniently located to serve the needs of you and your family.             Review of your medicines      START taking        Dose / Directions Last dose taken    ondansetron 4 MG ODT tab   Commonly known as:  ZOFRAN ODT   Dose:  4 mg   Quantity:  5 tablet        Take 1 tablet (4 mg) by mouth every 8 hours as needed for nausea   Refills:  0          Our records show that you are taking the medicines listed below. If these are incorrect, please call your family doctor or clinic.        Dose / Directions Last dose taken    CYMBALTA PO        Refills:  0        HumaLOG KWIKpen 100 UNIT/ML injection   Generic drug:  insulin  lispro        Inject Subcutaneous 3 times daily (before meals)   Refills:  0        LEVEMIR FLEXPEN/FLEXTOUCH 100 UNIT/ML injection   Dose:  50 Units   Generic drug:  insulin detemir        Inject 50 Units Subcutaneous At Bedtime   Refills:  0        oxyCODONE-acetaminophen 5-325 MG per tablet   Commonly known as:  PERCOCET        Take by mouth every 4 hours as needed for moderate to severe pain   Refills:  0        PANTOPRAZOLE SODIUM PO        Refills:  0        ZOFRAN PO        Refills:  0                Prescriptions were sent or printed at these locations (1 Prescription)                   Other Prescriptions                Printed at Department/Unit printer (1 of 1)         ondansetron (ZOFRAN ODT) 4 MG ODT tab                Procedures and tests performed during your visit     CBC with platelets differential    Comprehensive metabolic panel    Lactic acid    Lipase    UA with Microscopic reflex to Culture      Orders Needing Specimen Collection     None      Pending Results     No orders found from 7/9/2017 to 7/12/2017.            Pending Culture Results     No orders found from 7/9/2017 to 7/12/2017.            Pending Results Instructions     If you had any lab results that were not finalized at the time of your Discharge, you can call the ED Lab Result RN at [phone]. You will be contacted by this team for any positive Lab results or changes in treatment. The nurses are available 7 days a week from 10A to 6:30P.  You can leave a message 24 hours per day and they will return your call.        Test Results From Your Hospital Stay        7/11/2017  5:09 AM      Component Results     Component Value Ref Range & Units Status    WBC 13.7 (H) 4.0 - 11.0 10e9/L Final    RBC Count 5.20 3.8 - 5.2 10e12/L Final    Hemoglobin 11.0 (L) 11.7 - 15.7 g/dL Final    Hematocrit 35.6 35.0 - 47.0 % Final    MCV 69 (L) 78 - 100 fl Final    MCH 21.2 (L) 26.5 - 33.0 pg Final    MCHC 30.9 (L) 31.5 - 36.5 g/dL Final    RDW  18.4 (H) 10.0 - 15.0 % Final    Platelet Count 360 150 - 450 10e9/L Final    Diff Method Automated Method  Final    % Neutrophils 76.1 % Final    % Lymphocytes 18.3 % Final    % Monocytes 4.6 % Final    % Eosinophils 0.1 % Final    % Basophils 0.4 % Final    % Immature Granulocytes 0.5 % Final    Nucleated RBCs 0 0 /100 Final    Absolute Neutrophil 10.4 (H) 1.6 - 8.3 10e9/L Final    Absolute Lymphocytes 2.5 0.8 - 5.3 10e9/L Final    Absolute Monocytes 0.6 0.0 - 1.3 10e9/L Final    Absolute Eosinophils 0.0 0.0 - 0.7 10e9/L Final    Absolute Basophils 0.1 0.0 - 0.2 10e9/L Final    Abs Immature Granulocytes 0.1 0 - 0.4 10e9/L Final    Absolute Nucleated RBC 0.0  Final         7/11/2017  5:25 AM      Component Results     Component Value Ref Range & Units Status    Sodium 141 133 - 144 mmol/L Final    Potassium 3.4 3.4 - 5.3 mmol/L Final    Chloride 106 94 - 109 mmol/L Final    Carbon Dioxide 27 20 - 32 mmol/L Final    Anion Gap 8 3 - 14 mmol/L Final    Glucose 222 (H) 70 - 99 mg/dL Final    Urea Nitrogen 12 7 - 30 mg/dL Final    Creatinine 0.48 (L) 0.52 - 1.04 mg/dL Final    GFR Estimate >90  Non  GFR Calc   >60 mL/min/1.7m2 Final    GFR Estimate If Black >90   GFR Calc   >60 mL/min/1.7m2 Final    Calcium 8.9 8.5 - 10.1 mg/dL Final    Bilirubin Total 1.8 (H) 0.2 - 1.3 mg/dL Final    Albumin 4.2 3.4 - 5.0 g/dL Final    Protein Total 7.7 6.8 - 8.8 g/dL Final    Alkaline Phosphatase 86 40 - 150 U/L Final    ALT 45 0 - 50 U/L Final    AST 21 0 - 45 U/L Final         7/11/2017  5:25 AM      Component Results     Component Value Ref Range & Units Status    Lipase 113 73 - 393 U/L Final         7/11/2017  5:34 AM      Component Results     Component Value Ref Range & Units Status    Lactic Acid 2.6 (H) 0.4 - 2.0 mmol/L Final         7/11/2017  6:29 AM      Component Results     Component Value Ref Range & Units Status    Color Urine Yellow  Final    Appearance Urine Clear  Final    Glucose  Urine 150 (A) NEG mg/dL Final    Bilirubin Urine Negative NEG Final    Ketones Urine 80 (A) NEG mg/dL Final    Specific Gravity Urine 1.020 1.003 - 1.035 Final    Blood Urine Negative NEG Final    pH Urine 6.0 5.0 - 7.0 pH Final    Protein Albumin Urine Negative NEG mg/dL Final    Urobilinogen mg/dL 2.0 0.0 - 2.0 mg/dL Final    Nitrite Urine Negative NEG Final    Leukocyte Esterase Urine Negative NEG Final    Source Midstream Urine  Final    WBC Urine 1 0 - 2 /HPF Final    RBC Urine 1 0 - 2 /HPF Final    Mucous Urine Present (A) NEG /LPF Final                Clinical Quality Measure: Blood Pressure Screening     Your blood pressure was checked while you were in the emergency department today. The last reading we obtained was  BP: 112/68 . Please read the guidelines below about what these numbers mean and what you should do about them.  If your systolic blood pressure (the top number) is less than 120 and your diastolic blood pressure (the bottom number) is less than 80, then your blood pressure is normal. There is nothing more that you need to do about it.  If your systolic blood pressure (the top number) is 120-139 or your diastolic blood pressure (the bottom number) is 80-89, your blood pressure may be higher than it should be. You should have your blood pressure rechecked within a year by a primary care provider.  If your systolic blood pressure (the top number) is 140 or greater or your diastolic blood pressure (the bottom number) is 90 or greater, you may have high blood pressure. High blood pressure is treatable, but if left untreated over time it can put you at risk for heart attack, stroke, or kidney failure. You should have your blood pressure rechecked by a primary care provider within the next 4 weeks.  If your provider in the emergency department today gave you specific instructions to follow-up with your doctor or provider even sooner than that, you should follow that instruction and not wait for up to  "4 weeks for your follow-up visit.        Thank you for choosing [facility]       Thank you for choosing [facility] for your care. Our goal is always to provide you with excellent care. Hearing back from our patients is one way we can continue to improve our services. Please take a few minutes to complete the written survey that you may receive in the mail after you visit with us. Thank you!        [employer] Information     [employer] lets you send messages to your doctor, view your test results, renew your prescriptions, schedule appointments and more. To sign up, go to [email or web address] . Click on \"Log in\" on the left side of the screen, which will take you to the Welcome page. Then click on \"Sign up Now\" on the right side of the page.     You will be asked to enter the access code listed below, as well as some personal information. Please follow the directions to create your username and password.     Your access code is: [email or web address]  Expires: 10/9/2017  6:48 AM     Your access code will  in 90 days. If you need help or a new code, please call your [facility] clinic or [phone].        Care EveryWhere ID     This is your Care EveryWhere ID. This could be used by other organizations to access your [facility] medical records  [id number]        Equal Access to Services     MARIAN MCMANUS : Hadii geronimo Colbert, waaxda luqadaha, qaybta kaalmada adeviolayada, montse christian. So [facility] [phone].    ATENCIÓN: Si habla español, tiene a brunson disposición servicios gratuitos de asistencia lingüística. Llame al [phone].    We comply with applicable federal civil rights laws and [place] laws. We do not discriminate on the basis of race, color, national origin, age, disability sex, sexual orientation or gender identity.            After Visit Summary       This is your record. Keep this with you and show to your community pharmacist(s) and doctor(s) at your next visit.                "

## 2017-07-11 NOTE — ED NOTES
"[facility]  ED Nurse Handoff Report    Elizabet Hooker is a 46 year old female   ED Chief complaint: Nausea & Vomiting and Abdominal Pain  . ED Diagnosis:   Final diagnoses:   Nausea and vomiting, intractability of vomiting not specified, unspecified vomiting type   Chronic abdominal pain     Allergies: No Known Allergies    Code Status: Full Code  Activity level - Baseline/Home:  Independent. Activity Level - Current:   Independent. Lift room needed: No. Bariatric: No   Needed: No   Isolation: No. Infection: Not Applicable.     Vital Signs:   Vitals:    07/11/17 0600 07/11/17 0605 07/11/17 0635 07/11/17 0715   BP: 109/71 112/63 112/68    Resp:       Temp:       TempSrc:       SpO2: 95% 93% 95% 99%       Cardiac Rhythm:  ,      Pain level: 0-10 Pain Scale: 10  Patient confused: No. Patient Falls Risk: Yes.   Elimination Status: Has voided   Patient Report - Initial Complaint:    Patient is hear visiting from [place]. States she has been dealing with N/V and abdominal pain for the last 2 years and had multiple workups with no definitive cause for symptoms. Pt reports burning pain in her abdomen.   ER     Focused Assessmefnt:   Gastrointestinal Gastrointestinal - GI WDL:  WDL except Nausea/Vomiting Signs/Symptoms: nausea continuous; dry-heaving; emesis (bile) GI Signs/Symptoms: abdominal pain; belching (burning abdominal pain) ER    04:30 Skin Color/Condition Skin - Skin WDL: characteristics Skin Temperature: cool Skin Moisture: clammy; diaphoretic; moist ER     Update: Patient with continuous nausea and dry heaving since I arrived at 0710 and took over patients care. I was in to patients room at 0715 to discharge her to home when she both complained of nausea and complained about her IV being \"leaking\". IV found to be pulled out and lying by patient. Site with minimal bleeding, dressing applied. Dr Paul notified of events and Zofran ODT was given.     At 0800 patient was reassessed, Dr" Beverly had been in to see her. IV re-inserted at 0805. Patient to be admitted to OBS. I entered the room to give additional nausea medications at 0815 and her IV was out again, lying on bed with blood on gown, bed and arms.  Patient instructed to be careful not to pull out her IV. Complains of nausea and abdominal pain and requesting pain meds.     At 0830 2 additional attempts were made to re-insert IV by another nurse without success.  At 0835 IV placed in left wrist and secured by writer. Velcro arm board applied to protect IV. meds given shortly after for pain and nausea, including benadryl for restlessness s/p reglan and phenergan given earlier.   Tests Performed: labs. Abnormal Results:   UA with Microscopic reflex to Culture Resulted Abnormal Result -   Color Urine: Yellow  Appearance Urine: Clear  Glucose Urine: 150 mg/dL [Ref Range: NEG]  Bilirubin Urine: Negative [Ref Range: NEG]  Ketones Urine: 80 mg/dL [Ref Range: NEG]  Specific Gravity Urine: 1.020 [Ref Range: 1.003 - 1.035]  Blood Urine: Negative [Ref Range: NEG]  pH Urine: 6.0 pH [Ref Range: 5.0 - 7.0]  Protein Albumin Urine: Negative mg/dL [Ref Range: NEG]  Urobilinogen mg/dL: 2.0 mg/dL [Ref Range: 0.0 - 2.0]  Nitrite Urine: Negative [Ref Range: NEG]  Leukocyte Esterase Urine: Negative [Ref Range: NEG]  Source: Midstream Urine  WBC Urine: 1 /HPF [Ref Range: 0 - 2]  RBC Urine: 1 /HPF [Ref Range: 0 - 2]  Mucous Urine: Present /LPF [Ref Range: NEG]  Collected: 7/11/2017 06:02  Last updated: 7/11/2017 06:29     .    Abnormal Result -   Sodium: 141 mmol/L [Ref Range: 133 - 144]  Potassium: 3.4 mmol/L [Ref Range: 3.4 - 5.3]  Chloride: 106 mmol/L [Ref Range: 94 - 109]  Carbon Dioxide: 27 mmol/L [Ref Range: 20 - 32]  Anion Gap: 8 mmol/L [Ref Range: 3 - 14]  Glucose: 222 mg/dL [Ref Range: 70 - 99]  Urea Nitrogen: 12 mg/dL [Ref Range: 7 - 30]  Creatinine: 0.48 mg/dL [Ref Range: 0.52 - 1.04]  GFR Estimate: >90   Non African American GFR Calc   mL/min/1.7m2  [Ref Range: >60]  GFR Estimate If Black: >90   African American GFR Calc   mL/min/1.7m2 [Ref Range: >60]  Calcium: 8.9 mg/dL [Ref Range: 8.5 - 10.1]  Bilirubin Total: 1.8 mg/dL [Ref Range: 0.2 - 1.3]  Albumin: 4.2 g/dL [Ref Range: 3.4 - 5.0]  Protein Total: 7.7 g/dL [Ref Range: 6.8 - 8.8]  Alkaline Phosphatase: 86 U/L [Ref Range: 40 - 150]  ALT: 45 U/L [Ref Range: 0 - 50]  AST: 21 U/L [Ref Range: 0 - 45]  Collected: 7/11/2017 04:58  Last updated: 7/11/2017 05:25 FI     05:16 Orders Placed UA with Microscopic reflex to Culture     05:09 CBC with platelets differential Resulted Abnormal Result -   WBC: 13.7 10e9/L [Ref Range: 4.0 - 11.0]  RBC Count: 5.20 10e12/L [Ref Range: 3.8 - 5.2]  Hemoglobin: 11.0 g/dL [Ref Range: 11.7 - 15.7]  Hematocrit: 35.6 % [Ref Range: 35.0 - 47.0]  MCV: 69 fl [Ref Range: 78 - 100]  MCH: 21.2 pg [Ref Range: 26.5 - 33.0]  MCHC: 30.9 g/dL [Ref Range: 31.5 - 36.5]  RDW: 18.4 % [Ref Range: 10.0 - 15.0]  Platelet Count: 360 10e9/L [Ref Range: 150 - 450]  Diff Method: Automated Method  % Neutrophils: 76.1 %  % Lymphocytes: 18.3 %  % Monocytes: 4.6 %  % Eosinophils: 0.1 %  % Basophils: 0.4 %  % Immature Granulocytes: 0.5 %  Nucleated RBCs: 0 /100 [Ref Range: 0]  Absolute Neutrophil: 10.4 10e9/L [Ref Range: 1.6 - 8.3]  Absolute Lymphocytes: 2.5 10e9/L [Ref Range: 0.8 - 5.3]  Absolute Monocytes: 0.6 10e9/L [Ref Range: 0.0 - 1.3]  Absolute Eosinophils: 0.0 10e9/L [Ref Range: 0.0 - 0.7]  Absolute Basophils: 0.1 10e9/L [Ref Range: 0.0 - 0.2]  Abs Immature Granulocytes: 0.1 10e9/L [Ref Range: 0 - 0.4]  Absolute Nucleated RBC: 0.0  Collected: 7/11/2017 04:58  Last updated: 7/11/2017 05:09         Treatments provided: nausea meds, pain meds, fluids  Family Comments: none  OBS brochure/video discussed/provided to patient:  No  ED Medications:   Medications   morphine (PF) injection 4 mg (4 mg Intravenous Not Given 7/11/17 0601)   0.9% sodium chloride BOLUS (1,000 mLs Intravenous New Bag 7/11/17 4738)    metoclopramide (REGLAN) injection 10 mg (10 mg Intravenous Given 7/11/17 0437)   0.9% sodium chloride BOLUS (0 mLs Intravenous Stopped 7/11/17 0654)   ondansetron (ZOFRAN) injection 4 mg (4 mg Intravenous Given 7/11/17 0436)   morphine (PF) injection 4 mg (4 mg Intravenous Given 7/11/17 0458)   promethazine (PHENERGAN) IV injection 25 mg (25 mg Intravenous Given 7/11/17 0557)   ondansetron (ZOFRAN-ODT) ODT tab 4 mg (4 mg Oral Given 7/11/17 0727)   morphine (PF) injection 2 mg (2 mg Intravenous Given 7/11/17 0839)   metoclopramide (REGLAN) injection 5 mg (5 mg Intravenous Given 7/11/17 0838)   diphenhydrAMINE (BENADRYL) injection 25 mg (25 mg Intravenous Given 7/11/17 0838)     Drips infusing:  yes         ED Nurse Name/Phone Number: Yani Zapata RN *[id number]  8:56 AM    RECEIVING UNIT ED HANDOFF REVIEW    Above ED Nurse Handoff Report was reviewed: Yes  Reviewed by: Linda Salazar on July 11, 2017 at 9:11 AM

## 2017-07-11 NOTE — PROGRESS NOTES
ROOM # 203    Living Situation (if not independent, order SW consult): Ind  Facility name:  : [facility]     Activity level at baseline: Ind  Activity level on admit: Ind      Patient registered to observation; given Patient Bill of Rights; given the opportunity to ask questions about observation status and their plan of care.  Patient has been oriented to the observation room, bathroom and call light is in place.    Discussed discharge goals and expectations with patient/family.

## 2017-07-11 NOTE — PHARMACY-ADMISSION MEDICATION HISTORY
Admission medication history interview status for this patient is complete. See [facility] admission navigator for allergy information, prior to admission medications and immunization status.     Medication history interview source(s):Patient  Medication history resources (including written lists, pill bottles, clinic record):Psychiatric list  Primary pharmacy: [facility]    Changes made to [facility] medication list:  Added: None  Deleted: zofran  Changed: cymbalta, humalog, protonix    Actions taken by pharmacist (provider contacted, etc):None     Additional medication history information:None    Medication reconciliation/reorder completed by provider prior to medication history? No    Do you take OTC medications (eg tylenol, ibuprofen, fish oil, eye/ear drops, etc)? N (Y/N)    For patients on insulin therapy: Y (Y/N)  Lantus/levemir/NPH/Mix 70/30 dose:   (Y/N) (see below)   Sliding scale Novolog: N  If Yes, do you have a baseline novolog pre-meal dose:  units with meals   Patients eat three meals a day:   Y     Any Barriers to therapy:  cost of medications/comfortable with giving injections (if applicable)/ comfortable and confident with current diabetes regimen: None      Prior to Admission medications    Medication Sig Last Dose Taking? Auth Provider   insulin detemir (LEVEMIR FLEXPEN/FLEXTOUCH) 100 UNIT/ML injection Inject 50 Units Subcutaneous At Bedtime 7/10/2017 at hs Yes Reported, Patient   DULoxetine HCl (CYMBALTA PO) Take 60 mg by mouth every evening  7/10/2017 at pm Yes Reported, Patient   oxyCODONE-acetaminophen (PERCOCET) 5-325 MG per tablet Take by mouth every 4 hours as needed for moderate to severe pain Past Month at Unknown time Yes Reported, Patient   insulin lispro (HUMALOG KWIKPEN) 100 UNIT/ML injection Inject 10-15 Units Subcutaneous 3 times daily (before meals)  7/10/2017 at Unknown time Yes Reported, Patient   PANTOPRAZOLE SODIUM PO Take 40 mg by mouth 2 times daily (before meals)  7/11/2017 at am Yes  Reported, Patient   ondansetron (ZOFRAN ODT) 4 MG ODT tab Take 1 tablet (4 mg) by mouth every 8 hours as needed for nausea  Yes Wang Mg MD

## 2017-07-11 NOTE — H&P
[facility] Outpatient / Observation Unit  History and Physical Exam     Elizabet Hooker MRN# [id number]   YOB: 1970 Age: 46 year old      Date of Admission:  7/11/2017    Primary care provider: Ck Macias Md          Assessment:   Elizabet Hooker is a 46 year old female with a PMH significant for DM II, GERD, anxiety, chronic pain s/p partial nephrectomy, who presents with acute nausea, vomiting, generalized abdominal pain and po intolerance. UDS positive for cannabinoids and opiates (Norco on her medical record for previous partial nephrectomy). She denies use of marijuana and per patient report has had an extensive abdominal workup through [facility] (patient lives in [facility], and is here visiting family). UDS raises suspicion for cannabinoid hyperemesis syndrome.   Work up in the ED reveals: , /93 which normalized to 112/68, o/w VS unremarkable. CMP unremarkable, total bilirubin 1.8 (had cholecystectomy). CBC w/ diff demonstrates mild leukocytosis of 13.7 w/ absolute neutrophilia of 10.4. Hgb 11.0. Lactic acid 2.6. UA remarkable for 150 glucose, ketones 80. UDS positive for opiates and cannabinoids.  Patient will be registered to Observation for further evaluation and symptom management.     1. Intractable nausea & vomiting: Suspect cannabinoid hyperemesis syndrome with UDS positive for cannabinoids. Also positive for opiates. Patient denies cannabis use. Reports the last time she took her percocet for chronic flank pain related to a past partial nephrectomy was 2 weeks ago.  -Obtain orthostatics  -Supportive cares, bowel rest, IVF at 125 cc/hr, prn analgesia w/ IV toradol if cannot tolerate po.  -Prn antiemetics (zofran, compazine, reglan, IV ativan)  -ADAT  -Request records from [facility] for continuity of care.  -Offer hot shower to see if this provides any symptomatic relief.    2. Lactic acidosis: Lactic acid 2.6. Suspect from GI  symptoms. She is not on metformin. No known liver disease with normal CMP. VSS. UA does not appear infected. No fever or diarrhea. Low suspicion for infectious process.  -Will recheck tomorrow with AM labs.    3. DM II: Patient reports she is compliant with her insulin and lantus regimen. Reports she checks her blood sugar at least QID.  -Halve lantus to 25 units this evening with poor po intake.  -Continue pre-meal insulin per home regimen once eating.  -Blood sugar checks QID (mealtimes and HS).  -Moderate SSI while not tolerating po.  -Obtain A1c w/ AM labs.    4. GERD: IV pantoprazole 40 mg daily while not tolerating po. Resume pantoprazole 40 mg BID once able.         Plan:     1. [place] to Observation  2. Obtain orthostatics  3. IV hydration with Normal saline, @, 125 ml/hr  4. Supportive care with anti-emetics, pain meds PRN  5. Clear liquids as tolerated, ADAT  6. No reported diarrhea. No stool cultures ordered at this time.  7. Follow labs  8. DVT prophylaxis: pt at low risk, encourage ambulation.                  Chief Complaint:   Acute nausea, vomiting, and abdominal pain         History of Present Illness:   Elizabet Hooker is a 46 year old female who presents with c/o nausea, vomiting, and generalized abdominal pain. History is somewhat limited as the patient is actively retching and responds to questions with very brief answers. Ms. Hooker reports she had acute onset of lower generalized abdominal pain and nausea with vomiting that started around 1:30 AM.  She reports she has had an extensive abdominal workup in the past through her medical care system in [facility], including ultrasounds, CT imaging, endoscopy, colonoscopy, and gastric emptying testing. She also reports she has never had these symptoms before.      She denies eating anything out of the ordinary last evening, thinks she had fruit and some other things but has a hard time specifying.  She denies illicit drug use, marijuana,  or cigarette smoking. She quit smoking cigarettes 6 years ago.  She denies sick contacts. Recent travel includes visiting here from [place] to see family per ER report. When asked if she is on pain meds, she reports she takes percocet prn for chronic flank pain she has had since a partial nephrectomy several years ago for kidney cancer; she reports last taking percocet 2 weeks ago. Past abdominal surgical history includes cholecystectomy.    She reports intermittent sweats and chills but measured fever. She denies cold or cough symptoms. Nose is a little runny but that started after she started vomiting a lot.  No diarrhea. No recent antibiotics on med list.     Work up in the ED revealed: , /93 which normalized to 112/68, o/w VS unremarkable. CMP unremarkable, total bilirubin 1.8 (had cholecystectomy). CBC w/ diff demonstrates mild leukocytosis of 13.7 w/ absolute neutrophilia of 10.4. Hgb 11.0. Lactic acid 2.6. UA remarkable for 150 glucose, ketones 80. UDS positive for opiates and cannabinoids. She received 2 L IV NS, 25 mg IV benadryl, 15 mg IV reglan, 10 mg IV compazine, 6 mg IV morphine, 4 mg IV zofran, 4 mg ODT zofran, and 25 mg IV phenergan in the ED, initially symptoms improved but then patient reported nausea and pain had returned.    History obtained from sign out from ED provider Dr. Paul, chart review, as well as interview with the patient.               Past Medical History:     Past Medical History:   Diagnosis Date     Anxiety      Cancer (H)     kidney     Diabetes (H)      Thalassemia           Past Surgical History:     Past Surgical History:   Procedure Laterality Date     CHOLECYSTECTOMY       KIDNEY SURGERY            Social History:     Social History     Social History     Marital status: Single     Spouse name: N/A     Number of children: N/A     Years of education: N/A     Occupational History     Not on file.     Social History Main Topics     Smoking status: Never Smoker      Smokeless tobacco: Not on file     Alcohol use No     Drug use: No     Sexual activity: Not on file     Other Topics Concern     Not on file     Social History Narrative     No narrative on file          Family History:   Family history reviewed and noncontributory.         Allergies:    No Known Allergies         Medications:     Prior to Admission medications    Medication Sig Last Dose Taking? Auth Provider   insulin detemir (LEVEMIR FLEXPEN/FLEXTOUCH) 100 UNIT/ML injection Inject 50 Units Subcutaneous At Bedtime 7/10/2017 at hs Yes Reported, Patient   DULoxetine HCl (CYMBALTA PO) Take 60 mg by mouth every evening  7/10/2017 at pm Yes Reported, Patient   oxyCODONE-acetaminophen (PERCOCET) 5-325 MG per tablet Take by mouth every 4 hours as needed for moderate to severe pain Past Month at Unknown time Yes Reported, Patient   insulin lispro (HUMALOG KWIKPEN) 100 UNIT/ML injection Inject 10-15 Units Subcutaneous 3 times daily (before meals)  7/10/2017 at Unknown time Yes Reported, Patient   PANTOPRAZOLE SODIUM PO Take 40 mg by mouth 2 times daily (before meals)  7/11/2017 at am Yes Reported, Patient   ondansetron (ZOFRAN ODT) 4 MG ODT tab Take 1 tablet (4 mg) by mouth every 8 hours as needed for nausea  Yes Wang Mg MD          Review of Systems:   A Comprehensive greater than 10 system review of systems was carried out.  Pertinent positives and negatives are noted above.  Otherwise negative for contributory information.          Physical Exam:   Blood pressure 112/68, temperature 97.6  F (36.4  C), temperature source Temporal, resp. rate 24, SpO2 99 %.    GENERAL: healthy, alert and appears uncomfortable, actively retching, non-toxic appearing  EYES: Somewhat limited exam as patient does not keep her eyes open long and often has her hands over it. Eyes grossly normal to inspection, extraocular movements - intact, and PERRL  HENT: Nose- normal; Mouth- no ulcers, no lesions.   ORAL MUCOSA: mildly  dry  NECK: no tenderness, no adenopathy, no asymmetry, no masses, no stiffness; thyroid- normal to palpation  RESP: lungs clear to auscultation - no rales, no rhonchi, no wheezes  CV: regular rates and rhythm, normal S1 S2, no S3 or S4 and no murmur, no click or rub   ABDOMEN: soft with reported tenderness throughout, without hepatosplenomegaly or masses; no peritoneal signs of rebound tenderness or rigidity  MS: extremities- no gross deformities noted, no edema  SKIN: mild diaphoresis, no suspicious lesions, no rashes  NEURO: strength and tone- normal, sensory exam- grossly normal, mentation- intact, speech- normal, reflexes- symmetric  BACK: Unable to assess for CVA tenderness as patient lying on back and rolls back and forth during exam, no paralumbar tenderness  PSYCH: Alert and oriented times 3. Affect is normal.          Data:     Results for orders placed or performed during the hospital encounter of 07/11/17 (from the past 48 hour(s))   CBC with platelets differential   Result Value Ref Range    WBC 13.7 (H) 4.0 - 11.0 10e9/L    RBC Count 5.20 3.8 - 5.2 10e12/L    Hemoglobin 11.0 (L) 11.7 - 15.7 g/dL    Hematocrit 35.6 35.0 - 47.0 %    MCV 69 (L) 78 - 100 fl    MCH 21.2 (L) 26.5 - 33.0 pg    MCHC 30.9 (L) 31.5 - 36.5 g/dL    RDW 18.4 (H) 10.0 - 15.0 %    Platelet Count 360 150 - 450 10e9/L    Diff Method Automated Method     % Neutrophils 76.1 %    % Lymphocytes 18.3 %    % Monocytes 4.6 %    % Eosinophils 0.1 %    % Basophils 0.4 %    % Immature Granulocytes 0.5 %    Nucleated RBCs 0 0 /100    Absolute Neutrophil 10.4 (H) 1.6 - 8.3 10e9/L    Absolute Lymphocytes 2.5 0.8 - 5.3 10e9/L    Absolute Monocytes 0.6 0.0 - 1.3 10e9/L    Absolute Eosinophils 0.0 0.0 - 0.7 10e9/L    Absolute Basophils 0.1 0.0 - 0.2 10e9/L    Abs Immature Granulocytes 0.1 0 - 0.4 10e9/L    Absolute Nucleated RBC 0.0    Comprehensive metabolic panel   Result Value Ref Range    Sodium 141 133 - 144 mmol/L    Potassium 3.4 3.4 - 5.3  mmol/L    Chloride 106 94 - 109 mmol/L    Carbon Dioxide 27 20 - 32 mmol/L    Anion Gap 8 3 - 14 mmol/L    Glucose 222 (H) 70 - 99 mg/dL    Urea Nitrogen 12 7 - 30 mg/dL    Creatinine 0.48 (L) 0.52 - 1.04 mg/dL    GFR Estimate >90  Non  GFR Calc   >60 mL/min/1.7m2    GFR Estimate If Black >90   GFR Calc   >60 mL/min/1.7m2    Calcium 8.9 8.5 - 10.1 mg/dL    Bilirubin Total 1.8 (H) 0.2 - 1.3 mg/dL    Albumin 4.2 3.4 - 5.0 g/dL    Protein Total 7.7 6.8 - 8.8 g/dL    Alkaline Phosphatase 86 40 - 150 U/L    ALT 45 0 - 50 U/L    AST 21 0 - 45 U/L   Lipase   Result Value Ref Range    Lipase 113 73 - 393 U/L   Lactic acid   Result Value Ref Range    Lactic Acid 2.6 (H) 0.4 - 2.0 mmol/L   UA with Microscopic reflex to Culture   Result Value Ref Range    Color Urine Yellow     Appearance Urine Clear     Glucose Urine 150 (A) NEG mg/dL    Bilirubin Urine Negative NEG    Ketones Urine 80 (A) NEG mg/dL    Specific Gravity Urine 1.020 1.003 - 1.035    Blood Urine Negative NEG    pH Urine 6.0 5.0 - 7.0 pH    Protein Albumin Urine Negative NEG mg/dL    Urobilinogen mg/dL 2.0 0.0 - 2.0 mg/dL    Nitrite Urine Negative NEG    Leukocyte Esterase Urine Negative NEG    Source Midstream Urine     WBC Urine 1 0 - 2 /HPF    RBC Urine 1 0 - 2 /HPF    Mucous Urine Present (A) NEG /LPF   Drug abuse screen 77 urine (FL, RH, SH)   Result Value Ref Range    Amphetamine Qual Urine  NEG     Negative   Cutoff for a negative amphetamine is 500 ng/mL or less.      Barbiturates Qual Urine  NEG     Negative   Cutoff for a negative barbiturate is 200 ng/mL or less.      Benzodiazepine Qual Urine  NEG     Negative   Cutoff for a negative benzodiazepine is 200 ng/mL or less.      Cannabinoids Qual Urine (A) NEG     Positive   Cutoff for a positive cannabinoid is greater than 50 ng/mL. This is an   unconfirmed screening result to be used for medical purposes only.      Cocaine Qual Urine  NEG     Negative   Cutoff for a  negative cocaine is 300 ng/mL or less.      Opiates Qualitative Urine (A) NEG     Positive   Cutoff for a positive opiate is greater than 300 ng/mL. This is an unconfirmed   screening result to be used for medical purposes only.      PCP Qual Urine  NEG     Negative   Cutoff for a negative PCP is 25 ng/mL or less.         Sonia Rosales PA-C

## 2017-07-11 NOTE — ED PROVIDER NOTES
History     Chief Complaint:  Nausea & Vomiting and Abdominal Pain    HPI   Elizabet Hooker is a 46 year old female with a history of insulin dependent type 2 diabetes who presents via EMS for evaluation of nausea, vomiting, and abdominal pain. The patient lives in [place] and is here visiting family. She reports she has been dealing with intermittent episodes of nausea, vomiting and abdominal pain for the past 2 years. She states she underwent extensive work up for this in [place] including CT, colonoscopy and endoscopy that all returned unremarkable. She is on Pantoprazole daily and Zofran for nausea. She reports she normally needs IV fluids and antiemetics and has good resolution of her symptoms.  The patient reports this episode started this afternoon and she was unable to control her symptoms with Zofran, prompting her to call 911 and come to the ED by EMS for evaluation. She was given 4 mg Zofran by EMS. Blood sugar was 178. On arrival to the ED, the patient is actively dry heaving and reports she is nauseous. She states she has diffuse burning abdominal pain but denies any focal pain. This is consistent with her previous episodes of this. She reports she feels dehydrated. The patient denies any fever, chills, diarrhea, or sick contacts at home. She denies drug, alcohol, or marijuana use. The patient denies eating anything tonight that could have triggered her symptoms and states she only ate a couple pieces of banana.     Allergies:  No Known Allergies     Medications:    insulin detemir (LEVEMIR FLEXPEN/FLEXTOUCH) 100 UNIT/ML injection  DULoxetine HCl (CYMBALTA PO)  oxyCODONE-acetaminophen (PERCOCET) 5-325 MG per tablet  insulin lispro (HUMALOG KWIKPEN) 100 UNIT/ML injection  PANTOPRAZOLE SODIUM PO  Ondansetron HCl (ZOFRAN PO)    Past Medical History:    Kidney Cancer  Type 2 diabetes    Past Surgical History:    Partial kidney removal     Family History:    History reviewed. No pertinent family history.      Social History:  Smoking status: No  Alcohol use: No  No drug or marijuana use  The patient lives in [place]       Review of Systems   Constitutional: Negative for appetite change, chills and fever.   Gastrointestinal: Positive for abdominal pain, nausea and vomiting. Negative for blood in stool and diarrhea.   Genitourinary: Negative for dysuria.   All other systems reviewed and are negative.      Physical Exam     Patient Vitals for the past 24 hrs:   BP Temp Temp src Heart Rate Resp SpO2   07/11/17 0600 109/71 - - - - 95 %   07/11/17 0553 117/64 - - 101 - -   07/11/17 0425 (!) 164/93 - - - - -   07/11/17 0415 (!) 164/93 - - - - 98 %   07/11/17 0414 - 97.6  F (36.4  C) Temporal 101 24 97 %     Physical Exam  Nursing note and vitals reviewed.  Constitutional: Cooperative. Pt actively vomiting.    HENT:   Mouth/Throat: Moist mucous membranes.   Eyes: EOMI, nonicteric sclera  Cardiovascular: Tachycardic, regular rhythm, no murmurs, rubs, or gallops  Pulmonary/Chest: Effort normal and breath sounds normal. No respiratory distress. No wheezes. No rales.   Abdominal: Soft. Mild epigastric tenderness with guarding/rigidity, nondistended, BS present.   Musculoskeletal: Normal range of motion.   Neurological: Alert. Moves all extremities spontaneously.   Skin: Skin is warm and dry. No rash noted.   Psychiatric: Normal mood and affect.       Emergency Department Course   Laboratory:  CBC: WBC 13.7(H), HGB 11.0(L), o/w WNL ()   CMP: Glucose 222(H), Creatinine 0.48(L), Bilirubin 1.8(H), o/w WNL   Lipase: 113  Lactic acid: 2.6(H)  UA: Glucose 150, Ketones 80, Mucous present, o/w negative    Interventions:  0436: NS 1L IV Bolus  0436: Zofran 4 mg IV  0437: Reglan 10 mg IV  0458: Morphine 4 mg IV  0557: Phenergan 25 mg IV  NS 1L IV Bolus    Emergency Department Course:  The patient arrived in the emergency department via EMS.  Past medical records, nursing notes, and vitals reviewed.  0418: I performed an exam of the  patient and obtained history, as documented above.  IV inserted and blood drawn.    0531: I rechecked the patient. Explained findings to the patient. The patient is feeling a little better but is still nauseous and in pain.     0623: I rechecked the patient. Explained findings to the patient. The patient is feeling improved.     I rechecked the patient.  Findings and plan explained to the Patient. Patient discharged home with instructions regarding supportive care, medications, and reasons to return. The importance of close follow-up was reviewed.     Impression & Plan      Medical Decision Makin year old female presents with nausea, vomiting and abdominal pain. The patient reports a long history of similar symptoms with extensive work up done in the [facility] with no obvious etiology of her symptoms. She states that usually when this happens, she needs IV fluids, some IV pain meds, and she is better within a few hours. Labs are notable for a mild leukocytosis which I suspect is stress demargination, mildly elevated lactic acid which is suspect is secondary to dehydration. The patient has ketones in her urine in support of this. She is given 2 L of fluid for compensation here. The patient's T bili is also mildly elevated, but there are no other LFT elevations. She has had her gallbladder out, and I am not concerned about obstruction at this time. She reports that she has had sphincter of oddi dilation in the past which did not improve her symptoms. Ultimately, after only 4 mg of Morphine, and doses of Reglan, Zofran, and Phenergan, the patient reports that her nausea, vomiting, and abdominal pain are improved. She is feeling stable for discharge home. The patient was planning on driving back to [facility] today after she wakes back up. Given improvement with antiemetics and minimal pain medication, I am not concerned for any emergent etiology at this time. The patient and I did discuss CT imaging  and she agrees that it is not necessary given she has had negative CT scans in the past. The patient is waiting for follow up with a gastroenterologist in [place]. I encouraged her to see her doctor this week after she gets back home. She is of course informed that she may return here at any time if her symptoms were to recur. She is in stable condition at the time of discharge, indications for return to the ED were discussed as well as follow up. All questions were answered and she is in agreement with the plan.    Diagnosis:    ICD-10-CM   1. Nausea and vomiting, intractability of vomiting not specified, unspecified vomiting type R11.2   2. Chronic abdominal pain R10.9    G89.29     Disposition: Discharged to home    Discharge Medications:  New Prescriptions    ONDANSETRON (ZOFRAN ODT) 4 MG ODT TAB    Take 1 tablet (4 mg) by mouth every 8 hours as needed for nausea     Neda Leslie  7/11/2017   [facility] EMERGENCY DEPARTMENT    I, Neda Leslie, am serving as a scribe at 4:18 AM on 7/11/2017 to document services personally performed by Wang Mg MD based on my observations and the provider's statements to me.        Wang Mg MD  07/12/17 0000

## 2017-07-11 NOTE — ED NOTES
Observation Brochure and Video   Patient informed of observation status based on provider's order. Observation Brochure was given and video was DECLINED.    Additional Labs  Abnormal Result -   Amphetamine Qual Urine: Negative   Cutoff for a negative amphetamine is 500 ng/mL or less.   [Ref Range: NEG]  Barbiturates Qual Urine: Negative   Cutoff for a negative barbiturate is 200 ng/mL or less.   [Ref Range: NEG]  Benzodiazepine Qual Urine: Negative   Cutoff for a negative benzodiazepine is 200 ng/mL or less.   [Ref Range: NEG]  Cannabinoids Qual Urine: Positive   Cutoff for a positive cannabinoid is greater than 50 ng/mL. This is an   unconfirmed screening result to be used for medical purposes only.   [Ref Range: NEG]  Cocaine Qual Urine: Negative   Cutoff for a negative cocaine is 300 ng/mL or less.   [Ref Range: NEG]  Opiates Qualitative Urine: Positive   Cutoff for a positive opiate is greater than 300 ng/mL. This is an unconfirmed   screening result to be used for medical purposes only.   [Ref Range: NEG]  PCP Qual Urine: Negative   Cutoff for a negative PCP is 25 ng/mL or less.   [Ref Range: NEG]  Collected: 7/11/2017 06:02  Last updated: 7/11/2017 09:05    Yani Zapata RN

## 2017-07-11 NOTE — ED AVS SNAPSHOT
[facility] Emergency Department    [address]    [facility] [address]    Phone:  [phone]    Fax:  [phone]                                       Elizabet Hooker   MRN: [id number]    Department:  [facility] Emergency Department   Date of Visit:  7/11/2017           After Visit Summary Signature Page     I have received my discharge instructions, and my questions have been answered. I have discussed any challenges I see with this plan with the nurse or doctor.    ..........................................................................................................................................  Patient/Patient Representative Signature      ..........................................................................................................................................  Patient Representative Print Name and Relationship to Patient    ..................................................               ................................................  Date                                            Time    ..........................................................................................................................................  Reviewed by Signature/Title    ...................................................              ..............................................  Date                                                            Time

## 2017-07-11 NOTE — PROGRESS NOTES
PRIMARY DIAGNOSIS: GASTROENTERITIS    OUTPATIENT/OBSERVATION GOALS TO BE MET BEFORE DISCHARGE  1. Orthostatic performed: No - pt claims too nauseated to perform right now.    2. Tolerating PO fluid and/or antibiotics (if applicable): No    3. Nausea/Vomiting/Diarrhea symptoms improved: No,  vomited x2 so far this shift    4. Pain status: Improved but still requiring IV toradol.    5. Return to near baseline physical activity: No    Discharge Planner Nurse   Safe discharge environment identified: Yes  Barriers to discharge: No       Entered by: Linda Salazar 07/11/2017 11:52 AM     Please review provider order for any additional goals.   Nurse to notify provider when observation goals have been met and patient is ready for discharge.    VSS, though temp 99.5. Pt has vomited x2 so far on OBS unit. PRN compazine and zofran given. Normal saline running IVF at 100/hr. Pt c/o abdominal burning rating 9/10 - IV toradol and protonix given. Pt moaning in sleep. Attempted to perform orthostatic blood pressures and pt refused stating she was too nauseated to do so right now. Will continue to monitor and provide supportive cares.

## 2017-07-12 VITALS
RESPIRATION RATE: 16 BRPM | SYSTOLIC BLOOD PRESSURE: 126 MMHG | OXYGEN SATURATION: 94 % | WEIGHT: 182 LBS | HEART RATE: 96 BPM | DIASTOLIC BLOOD PRESSURE: 69 MMHG | TEMPERATURE: 99.1 F | HEIGHT: 63 IN | BODY MASS INDEX: 32.25 KG/M2

## 2017-07-12 LAB
ANION GAP SERPL CALCULATED.3IONS-SCNC: 5 MMOL/L (ref 3–14)
BASOPHILS # BLD AUTO: 0 10E9/L (ref 0–0.2)
BASOPHILS NFR BLD AUTO: 0.3 %
BUN SERPL-MCNC: 14 MG/DL (ref 7–30)
CALCIUM SERPL-MCNC: 8.2 MG/DL (ref 8.5–10.1)
CHLORIDE SERPL-SCNC: 108 MMOL/L (ref 94–109)
CO2 SERPL-SCNC: 28 MMOL/L (ref 20–32)
CREAT SERPL-MCNC: 0.54 MG/DL (ref 0.52–1.04)
DIFFERENTIAL METHOD BLD: ABNORMAL
EOSINOPHIL # BLD AUTO: 0.1 10E9/L (ref 0–0.7)
EOSINOPHIL NFR BLD AUTO: 0.6 %
ERYTHROCYTE [DISTWIDTH] IN BLOOD BY AUTOMATED COUNT: 18.1 % (ref 10–15)
GFR SERPL CREATININE-BSD FRML MDRD: ABNORMAL ML/MIN/1.7M2
GLUCOSE BLDC GLUCOMTR-MCNC: 156 MG/DL (ref 70–99)
GLUCOSE BLDC GLUCOMTR-MCNC: 160 MG/DL (ref 70–99)
GLUCOSE BLDC GLUCOMTR-MCNC: 187 MG/DL (ref 70–99)
GLUCOSE SERPL-MCNC: 179 MG/DL (ref 70–99)
HBA1C MFR BLD: 8.3 % (ref 4.3–6)
HCT VFR BLD AUTO: 33.1 % (ref 35–47)
HGB BLD-MCNC: 10.3 G/DL (ref 11.7–15.7)
IMM GRANULOCYTES # BLD: 0 10E9/L (ref 0–0.4)
IMM GRANULOCYTES NFR BLD: 0.2 %
LACTATE BLD-SCNC: 0.8 MMOL/L (ref 0.7–2.1)
LYMPHOCYTES # BLD AUTO: 4.1 10E9/L (ref 0.8–5.3)
LYMPHOCYTES NFR BLD AUTO: 37 %
MCH RBC QN AUTO: 21.2 PG (ref 26.5–33)
MCHC RBC AUTO-ENTMCNC: 31.1 G/DL (ref 31.5–36.5)
MCV RBC AUTO: 68 FL (ref 78–100)
MONOCYTES # BLD AUTO: 0.5 10E9/L (ref 0–1.3)
MONOCYTES NFR BLD AUTO: 4.9 %
NEUTROPHILS # BLD AUTO: 6.4 10E9/L (ref 1.6–8.3)
NEUTROPHILS NFR BLD AUTO: 57 %
NRBC # BLD AUTO: 0 10*3/UL
NRBC BLD AUTO-RTO: 0 /100
PLATELET # BLD AUTO: 333 10E9/L (ref 150–450)
POTASSIUM SERPL-SCNC: 3.5 MMOL/L (ref 3.4–5.3)
RBC # BLD AUTO: 4.85 10E12/L (ref 3.8–5.2)
SODIUM SERPL-SCNC: 141 MMOL/L (ref 133–144)
WBC # BLD AUTO: 11.1 10E9/L (ref 4–11)

## 2017-07-12 PROCEDURE — 96372 THER/PROPH/DIAG INJ SC/IM: CPT

## 2017-07-12 PROCEDURE — G0378 HOSPITAL OBSERVATION PER HR: HCPCS

## 2017-07-12 PROCEDURE — 83605 ASSAY OF LACTIC ACID: CPT | Performed by: PHYSICIAN ASSISTANT

## 2017-07-12 PROCEDURE — 25000125 ZZHC RX 250: Performed by: PHYSICIAN ASSISTANT

## 2017-07-12 PROCEDURE — 83036 HEMOGLOBIN GLYCOSYLATED A1C: CPT | Performed by: PHYSICIAN ASSISTANT

## 2017-07-12 PROCEDURE — 96376 TX/PRO/DX INJ SAME DRUG ADON: CPT

## 2017-07-12 PROCEDURE — 99217 ZZC OBSERVATION CARE DISCHARGE: CPT | Performed by: HOSPITALIST

## 2017-07-12 PROCEDURE — 36415 COLL VENOUS BLD VENIPUNCTURE: CPT | Performed by: PHYSICIAN ASSISTANT

## 2017-07-12 PROCEDURE — 00000146 ZZHCL STATISTIC GLUCOSE BY METER IP

## 2017-07-12 PROCEDURE — 96361 HYDRATE IV INFUSION ADD-ON: CPT

## 2017-07-12 PROCEDURE — 85025 COMPLETE CBC W/AUTO DIFF WBC: CPT | Performed by: PHYSICIAN ASSISTANT

## 2017-07-12 PROCEDURE — 80048 BASIC METABOLIC PNL TOTAL CA: CPT | Performed by: PHYSICIAN ASSISTANT

## 2017-07-12 PROCEDURE — 25000131 ZZH RX MED GY IP 250 OP 636 PS 637: Performed by: PHYSICIAN ASSISTANT

## 2017-07-12 RX ADMIN — PANTOPRAZOLE SODIUM 40 MG: 40 INJECTION, POWDER, FOR SOLUTION INTRAVENOUS at 08:15

## 2017-07-12 RX ADMIN — INSULIN ASPART 5 UNITS: 100 INJECTION, SOLUTION INTRAVENOUS; SUBCUTANEOUS at 08:16

## 2017-07-12 NOTE — PLAN OF CARE
Problem: Discharge Planning  Goal: Discharge Planning (Adult, OB, Behavioral, Peds)  PRIMARY DIAGNOSIS: GASTROENTERITIS      OUTPATIENT/OBSERVATION GOALS TO BE MET BEFORE DISCHARGE  1. Orthostatic performed: Yes      2. Tolerating PO fluid and/or antibiotics (if applicable): Tolerated bites of jell-o and sips of water      3. Nausea/Vomiting/Diarrhea symptoms improved: Yes      4. Pain status: Pain free.      5. Return to near baseline physical activity: No - pt reports feeling weak      Pt resting comfortably at this time. No complaints of pain or nausea, tolerated a few bites of jell-o. , 1 unit sliding scale given. BGM checks Q4H. Will continue to monitor.      Discharge Planner Nurse   Safe discharge environment identified: Yes, once medically cleared  Barriers to discharge: Not once medically cleared      Please review provider order for any additional goals.   Nurse to notify provider when observation goals have been met and patient is ready for discharge

## 2017-07-12 NOTE — PLAN OF CARE
Problem: Discharge Planning  Goal: Discharge Planning (Adult, OB, Behavioral, Peds)  Outcome: Improving  PRIMARY DIAGNOSIS: GASTROENTERITIS      OUTPATIENT/OBSERVATION GOALS TO BE MET BEFORE DISCHARGE  1. Orthostatic performed: Yes      2. Tolerating PO fluid and/or antibiotics (if applicable): Pt resting, will try PO later this shift      3. Nausea/Vomiting/Diarrhea symptoms improved: Yes      4. Pain status: Pain free.      5. Return to near baseline physical activity: No - pt reports feeling weak      Pt resting comfortably at this time. No complaints of pain or nausea, tolerated a few bites of jell-o. , 1 unit sliding scale given. BGM checks Q4H. Will continue to monitor.      Discharge Planner Nurse   Safe discharge environment identified: Yes, once medically cleared  Barriers to discharge: Not once medically cleared      Please review provider order for any additional goals.   Nurse to notify provider when observation goals have been met and patient is ready for discharge

## 2017-07-12 NOTE — DISCHARGE SUMMARY
"[facility]  Discharge Summary  Name: Elizabet Hooker    MRN: [id number]  YOB: 1970    Age: 46 year old  Date of Discharge:  7/12/2017  Date of Admission: 7/11/2017  Primary Care Provider: Ck Macias Md  Discharge Physician:  Ant Hoffman MD  Discharging Service:  Hospitalist  Date of Service (when I saw the patient): 07/12/17    Discharge Diagnosis:  cannabinoid hyperemesis syndrome. Nausea, vomiting, abdominal pain     Other Diagnosis:  DM, GERD, anxiety, chronic pain, partial nephrectomy     Discharge Disposition:  Discharged to home     Allergies:  No Known Allergies     Discharge Medications:   Current Discharge Medication List      CONTINUE these medications which have NOT CHANGED    Details   insulin detemir (LEVEMIR FLEXPEN/FLEXTOUCH) 100 UNIT/ML injection Inject 50 Units Subcutaneous At Bedtime      DULoxetine HCl (CYMBALTA PO) Take 60 mg by mouth every evening       oxyCODONE-acetaminophen (PERCOCET) 5-325 MG per tablet Take by mouth every 4 hours as needed for moderate to severe pain      insulin lispro (HUMALOG KWIKPEN) 100 UNIT/ML injection Inject 10-15 Units Subcutaneous 3 times daily (before meals)       PANTOPRAZOLE SODIUM PO Take 40 mg by mouth 2 times daily (before meals)               Condition on Discharge:  Discharge condition: Stable   Discharge vitals: Blood pressure 126/69, pulse 96, temperature 99.1  F (37.3  C), temperature source Oral, resp. rate 16, height 1.6 m (5' 3\"), weight 82.6 kg (182 lb), SpO2 94 %.   Code status on discharge: Full Code     History of Illness:  See detailed admission note for full details.    46 year old female who presents with c/o nausea, vomiting, and generalized abdominal pain. History is somewhat limited as the patient is actively retching and responds to questions with very brief answers. Ms. Hooker reports she had acute onset of lower generalized abdominal pain and nausea with vomiting that started around 1:30 AM.  She " reports she has had an extensive abdominal workup in the past through her medical care system in [facility], including ultrasounds, CT imaging, endoscopy, colonoscopy, and gastric emptying testing. She also reports she has never had these symptoms before.       She denies eating anything out of the ordinary last evening, thinks she had fruit and some other things but has a hard time specifying.  She denies illicit drug use, marijuana, or cigarette smoking. She quit smoking cigarettes 6 years ago.  She denies sick contacts. Recent travel includes visiting here from [place] to see family per ER report. When asked if she is on pain meds, she reports she takes percocet prn for chronic flank pain she has had since a partial nephrectomy several years ago for kidney cancer; she reports last taking percocet 2 weeks ago. Past abdominal surgical history includes cholecystectomy.     She reports intermittent sweats and chills but measured fever. She denies cold or cough symptoms. Nose is a little runny but that started after she started vomiting a lot.  No diarrhea. No recent antibiotics on med list.      Work up in the ED revealed: , /93 which normalized to 112/68, o/w VS unremarkable. CMP unremarkable, total bilirubin 1.8 (had cholecystectomy). CBC w/ diff demonstrates mild leukocytosis of 13.7 w/ absolute neutrophilia of 10.4. Hgb 11.0. Lactic acid 2.6. UA remarkable for 150 glucose, ketones 80. UDS positive for opiates and cannabinoids. She received 2 L IV NS, 25 mg IV benadryl, 15 mg IV reglan, 10 mg IV compazine, 6 mg IV morphine, 4 mg IV zofran, 4 mg ODT zofran, and 25 mg IV phenergan in the ED, initially symptoms improved but then patient reported nausea and pain had returned.     History obtained from sign out from ED provider Dr. Paul, chart review, as well as interview with the patient.    Significant Physical Exam Findings:  Patient is doing well. No abdominal pain, n/v/d. Eating. Wants to DC  home  s1s2 rrr, lungs clear, adbo a+Bs, NT    Procedures:  none     Imaging:  No results found for this or any previous visit.     Consultations:  No consultations were requested during this admission.     Significant Lab Results:    Recent Labs  Lab 07/12/17  0515 07/11/17  0458   WBC 11.1* 13.7*   HGB 10.3* 11.0*   HCT 33.1* 35.6   MCV 68* 69*    360          Lab Results   Component Value Date     07/12/2017     07/11/2017    Lab Results   Component Value Date    CHLORIDE 108 07/12/2017    CHLORIDE 106 07/11/2017    Lab Results   Component Value Date    BUN 14 07/12/2017    BUN 12 07/11/2017      Lab Results   Component Value Date    POTASSIUM 3.5 07/12/2017    POTASSIUM 3.4 07/11/2017    Lab Results   Component Value Date    CO2 28 07/12/2017    CO2 27 07/11/2017    Lab Results   Component Value Date    CR 0.54 07/12/2017    CR 0.48 07/11/2017           Pending Results:    Unresulted Labs Ordered in the Past 30 Days of this Admission     No orders found for last 61 day(s).           Discharge Instructions and Follow-Up:   Discharge diet:   Active Diet Order      Advance Diet as Tolerated: Regular Diet Adult      Diet   Discharge activity: Activity as tolerated   Discharge follow-up: Follow up with primary care provider in 7 days   Outpatient therapy: None    Home Care agency: None    Other instructions: None      Hospital Course:  Patient was admitted to the Obs unit. Her symptoms were likely related to her marijuana use. She states she has had similar issues in the past. It seems she is willing to accept the diagnosis and will stop marijuana. Her symptoms improved. She will DC home. She is visiting from [place] with her nephew. She will likely stay in the area for another day then drive home.      Total time spent in face to face contact with the patient and coordinating discharge was:  30 Minutes.    Ant Hoffman MD  Pager: [phone]

## 2017-07-12 NOTE — PLAN OF CARE
Problem: Discharge Planning  Goal: Discharge Planning (Adult, OB, Behavioral, Peds)  PRIMARY DIAGNOSIS: GASTROENTERITIS      OUTPATIENT/OBSERVATION GOALS TO BE MET BEFORE DISCHARGE  1. Orthostatic performed: Yes     2. Tolerating PO fluid and/or antibiotics (if applicable): Pt resting, will try PO later this shift     3. Nausea/Vomiting/Diarrhea symptoms improved: Yes     4. Pain status: Pain free.     5. Return to near baseline physical activity: No - pt reports feeling weak     Pt resting comfortably at this time. No complaints of pain or nausea. BGM continues in the 200s, sliding scale given. BGM checks Q4H. Will continue to monitor.     Discharge Planner Nurse   Safe discharge environment identified: Yes, once medically cleared  Barriers to discharge: Not once medically cleared     Please review provider order for any additional goals.   Nurse to notify provider when observation goals have been met and patient is ready for discharge.

## 2017-07-12 NOTE — PLAN OF CARE
Problem: Discharge Planning  Goal: Discharge Planning (Adult, OB, Behavioral, Peds)  Outcome: Adequate for Discharge Date Met:  07/12/17  Patient's After Visit Summary was reviewed with patient and/or .   Patient verbalized understanding of After Visit Summary, recommended follow up and was given an opportunity to ask questions.   Discharge medications sent home with patient/family: Not applicable, no new medications sent with patient   Discharged with other:pt to take taxi to hotel